# Patient Record
Sex: MALE | Race: WHITE | NOT HISPANIC OR LATINO | URBAN - METROPOLITAN AREA
[De-identification: names, ages, dates, MRNs, and addresses within clinical notes are randomized per-mention and may not be internally consistent; named-entity substitution may affect disease eponyms.]

---

## 2019-05-01 ENCOUNTER — OUTPATIENT (OUTPATIENT)
Dept: OUTPATIENT SERVICES | Facility: HOSPITAL | Age: 24
LOS: 1 days | End: 2019-05-01
Payer: MEDICAID

## 2019-05-01 PROCEDURE — G9001: CPT

## 2019-05-04 ENCOUNTER — INPATIENT (INPATIENT)
Facility: HOSPITAL | Age: 24
LOS: 11 days | Discharge: HOME | End: 2019-05-16
Attending: PSYCHIATRY & NEUROLOGY | Admitting: PSYCHIATRY & NEUROLOGY
Payer: MEDICAID

## 2019-05-04 VITALS
SYSTOLIC BLOOD PRESSURE: 155 MMHG | RESPIRATION RATE: 16 BRPM | HEART RATE: 84 BPM | TEMPERATURE: 98 F | DIASTOLIC BLOOD PRESSURE: 82 MMHG

## 2019-05-04 DIAGNOSIS — F31.4 BIPOLAR DISORDER, CURRENT EPISODE DEPRESSED, SEVERE, WITHOUT PSYCHOTIC FEATURES: ICD-10-CM

## 2019-05-04 DIAGNOSIS — F12.10 CANNABIS ABUSE, UNCOMPLICATED: ICD-10-CM

## 2019-05-04 DIAGNOSIS — F60.3 BORDERLINE PERSONALITY DISORDER: ICD-10-CM

## 2019-05-04 LAB
ALBUMIN SERPL ELPH-MCNC: 5.1 G/DL — HIGH (ref 3.3–5)
ALP SERPL-CCNC: 52 U/L — SIGNIFICANT CHANGE UP (ref 40–120)
ALT FLD-CCNC: 17 U/L — SIGNIFICANT CHANGE UP (ref 4–41)
AMPHET UR-MCNC: NEGATIVE — SIGNIFICANT CHANGE UP
ANION GAP SERPL CALC-SCNC: 15 MMO/L — HIGH (ref 7–14)
APAP SERPL-MCNC: < 15 UG/ML — LOW (ref 15–25)
APPEARANCE UR: CLEAR — SIGNIFICANT CHANGE UP
AST SERPL-CCNC: 21 U/L — SIGNIFICANT CHANGE UP (ref 4–40)
BACTERIA # UR AUTO: NEGATIVE — SIGNIFICANT CHANGE UP
BARBITURATES UR SCN-MCNC: NEGATIVE — SIGNIFICANT CHANGE UP
BASOPHILS # BLD AUTO: 0.03 K/UL — SIGNIFICANT CHANGE UP (ref 0–0.2)
BASOPHILS NFR BLD AUTO: 0.4 % — SIGNIFICANT CHANGE UP (ref 0–2)
BENZODIAZ UR-MCNC: NEGATIVE — SIGNIFICANT CHANGE UP
BILIRUB SERPL-MCNC: 0.8 MG/DL — SIGNIFICANT CHANGE UP (ref 0.2–1.2)
BILIRUB UR-MCNC: SIGNIFICANT CHANGE UP
BLOOD UR QL VISUAL: SIGNIFICANT CHANGE UP
BUN SERPL-MCNC: 18 MG/DL — SIGNIFICANT CHANGE UP (ref 7–23)
CALCIUM SERPL-MCNC: 10.2 MG/DL — SIGNIFICANT CHANGE UP (ref 8.4–10.5)
CANNABINOIDS UR-MCNC: POSITIVE — SIGNIFICANT CHANGE UP
CHLORIDE SERPL-SCNC: 99 MMOL/L — SIGNIFICANT CHANGE UP (ref 98–107)
CO2 SERPL-SCNC: 23 MMOL/L — SIGNIFICANT CHANGE UP (ref 22–31)
COCAINE METAB.OTHER UR-MCNC: NEGATIVE — SIGNIFICANT CHANGE UP
COLOR SPEC: YELLOW — SIGNIFICANT CHANGE UP
CREAT SERPL-MCNC: 1.27 MG/DL — SIGNIFICANT CHANGE UP (ref 0.5–1.3)
EOSINOPHIL # BLD AUTO: 0.02 K/UL — SIGNIFICANT CHANGE UP (ref 0–0.5)
EOSINOPHIL NFR BLD AUTO: 0.3 % — SIGNIFICANT CHANGE UP (ref 0–6)
ETHANOL BLD-MCNC: < 10 MG/DL — SIGNIFICANT CHANGE UP
GLUCOSE SERPL-MCNC: 83 MG/DL — SIGNIFICANT CHANGE UP (ref 70–99)
GLUCOSE UR-MCNC: NEGATIVE — SIGNIFICANT CHANGE UP
HCT VFR BLD CALC: 49.6 % — SIGNIFICANT CHANGE UP (ref 39–50)
HGB BLD-MCNC: 16.6 G/DL — SIGNIFICANT CHANGE UP (ref 13–17)
IMM GRANULOCYTES NFR BLD AUTO: 0.3 % — SIGNIFICANT CHANGE UP (ref 0–1.5)
KETONES UR-MCNC: HIGH
LEUKOCYTE ESTERASE UR-ACNC: NEGATIVE — SIGNIFICANT CHANGE UP
LYMPHOCYTES # BLD AUTO: 1.67 K/UL — SIGNIFICANT CHANGE UP (ref 1–3.3)
LYMPHOCYTES # BLD AUTO: 22.9 % — SIGNIFICANT CHANGE UP (ref 13–44)
MCHC RBC-ENTMCNC: 30.5 PG — SIGNIFICANT CHANGE UP (ref 27–34)
MCHC RBC-ENTMCNC: 33.5 % — SIGNIFICANT CHANGE UP (ref 32–36)
MCV RBC AUTO: 91 FL — SIGNIFICANT CHANGE UP (ref 80–100)
METHADONE UR-MCNC: NEGATIVE — SIGNIFICANT CHANGE UP
MONOCYTES # BLD AUTO: 0.52 K/UL — SIGNIFICANT CHANGE UP (ref 0–0.9)
MONOCYTES NFR BLD AUTO: 7.1 % — SIGNIFICANT CHANGE UP (ref 2–14)
NEUTROPHILS # BLD AUTO: 5.02 K/UL — SIGNIFICANT CHANGE UP (ref 1.8–7.4)
NEUTROPHILS NFR BLD AUTO: 69 % — SIGNIFICANT CHANGE UP (ref 43–77)
NITRITE UR-MCNC: NEGATIVE — SIGNIFICANT CHANGE UP
NRBC # FLD: 0 K/UL — SIGNIFICANT CHANGE UP (ref 0–0)
OPIATES UR-MCNC: NEGATIVE — SIGNIFICANT CHANGE UP
OXYCODONE UR-MCNC: NEGATIVE — SIGNIFICANT CHANGE UP
PCP UR-MCNC: NEGATIVE — SIGNIFICANT CHANGE UP
PH UR: 6 — SIGNIFICANT CHANGE UP (ref 5–8)
PLATELET # BLD AUTO: 217 K/UL — SIGNIFICANT CHANGE UP (ref 150–400)
PMV BLD: 9.2 FL — SIGNIFICANT CHANGE UP (ref 7–13)
POTASSIUM SERPL-MCNC: 4.4 MMOL/L — SIGNIFICANT CHANGE UP (ref 3.5–5.3)
POTASSIUM SERPL-SCNC: 4.4 MMOL/L — SIGNIFICANT CHANGE UP (ref 3.5–5.3)
PROT SERPL-MCNC: 7.7 G/DL — SIGNIFICANT CHANGE UP (ref 6–8.3)
PROT UR-MCNC: 50 — SIGNIFICANT CHANGE UP
RBC # BLD: 5.45 M/UL — SIGNIFICANT CHANGE UP (ref 4.2–5.8)
RBC # FLD: 12.1 % — SIGNIFICANT CHANGE UP (ref 10.3–14.5)
RBC CASTS # UR COMP ASSIST: SIGNIFICANT CHANGE UP (ref 0–?)
SALICYLATES SERPL-MCNC: < 5 MG/DL — LOW (ref 15–30)
SODIUM SERPL-SCNC: 137 MMOL/L — SIGNIFICANT CHANGE UP (ref 135–145)
SP GR SPEC: 1.04 — SIGNIFICANT CHANGE UP (ref 1–1.04)
SQUAMOUS # UR AUTO: SIGNIFICANT CHANGE UP
TSH SERPL-MCNC: 0.85 UIU/ML — SIGNIFICANT CHANGE UP (ref 0.27–4.2)
UROBILINOGEN FLD QL: SIGNIFICANT CHANGE UP
WBC # BLD: 7.28 K/UL — SIGNIFICANT CHANGE UP (ref 3.8–10.5)
WBC # FLD AUTO: 7.28 K/UL — SIGNIFICANT CHANGE UP (ref 3.8–10.5)
WBC UR QL: SIGNIFICANT CHANGE UP (ref 0–?)

## 2019-05-04 PROCEDURE — 99285 EMERGENCY DEPT VISIT HI MDM: CPT

## 2019-05-04 RX ORDER — RISPERIDONE 4 MG/1
0.5 TABLET ORAL EVERY 6 HOURS
Qty: 0 | Refills: 0 | Status: DISCONTINUED | OUTPATIENT
Start: 2019-05-04 | End: 2019-05-16

## 2019-05-04 RX ORDER — TETANUS TOXOID, REDUCED DIPHTHERIA TOXOID AND ACELLULAR PERTUSSIS VACCINE, ADSORBED 5; 2.5; 8; 8; 2.5 [IU]/.5ML; [IU]/.5ML; UG/.5ML; UG/.5ML; UG/.5ML
0.5 SUSPENSION INTRAMUSCULAR ONCE
Qty: 0 | Refills: 0 | Status: COMPLETED | OUTPATIENT
Start: 2019-05-04 | End: 2019-05-04

## 2019-05-04 RX ORDER — CLONAZEPAM 1 MG
0.5 TABLET ORAL AT BEDTIME
Qty: 0 | Refills: 0 | Status: DISCONTINUED | OUTPATIENT
Start: 2019-05-04 | End: 2019-05-04

## 2019-05-04 RX ORDER — RISPERIDONE 4 MG/1
0.5 TABLET ORAL ONCE
Qty: 0 | Refills: 0 | Status: COMPLETED | OUTPATIENT
Start: 2019-05-04 | End: 2019-05-04

## 2019-05-04 RX ORDER — RISPERIDONE 4 MG/1
0.5 TABLET ORAL
Qty: 0 | Refills: 0 | Status: DISCONTINUED | OUTPATIENT
Start: 2019-05-04 | End: 2019-05-04

## 2019-05-04 RX ORDER — BUPROPION HYDROCHLORIDE 150 MG/1
150 TABLET, EXTENDED RELEASE ORAL DAILY
Qty: 0 | Refills: 0 | Status: DISCONTINUED | OUTPATIENT
Start: 2019-05-04 | End: 2019-05-04

## 2019-05-04 RX ADMIN — TETANUS TOXOID, REDUCED DIPHTHERIA TOXOID AND ACELLULAR PERTUSSIS VACCINE, ADSORBED 0.5 MILLILITER(S): 5; 2.5; 8; 8; 2.5 SUSPENSION INTRAMUSCULAR at 19:33

## 2019-05-04 RX ADMIN — Medication 2 MILLIGRAM(S): at 18:45

## 2019-05-04 RX ADMIN — Medication 1 MILLIGRAM(S): at 22:35

## 2019-05-04 RX ADMIN — Medication 0.5 MILLIGRAM(S): at 19:53

## 2019-05-04 RX ADMIN — RISPERIDONE 0.5 MILLIGRAM(S): 4 TABLET ORAL at 18:45

## 2019-05-04 NOTE — ED PROVIDER NOTE - CLINICAL SUMMARY MEDICAL DECISION MAKING FREE TEXT BOX
23 y/o M   Dx Depression. Labs , Urine Tox/UA, EKG.  Multiple superficial linear laceration to left arm. Small abrasion to left side of neck.  No indication for suturing , Tetanus updated.   Bacitracin to area.    Medical evaluation performed. There is no clinical evidence of intoxication or any acute medical problem requiring immediate intervention. Patient is awaiting psychiatric consultation. Final disposition will be determined by psychiatrist. Recommend inpatient psychiatric admission

## 2019-05-04 NOTE — ED BEHAVIORAL HEALTH ASSESSMENT NOTE - SUMMARY
Patient is a 25 y/o male, living with his father and younger sister, currently unemployed, with a history of self-reported bipolar d/o, ADD and anxiety, cannabis use d/o no past hospitalizations, no known medical hx, who is brought in by his father for evaluation for a possible suicide attempt. Pt has multiple superficial cuts on bilateral upper arms, and one smaller cut on his neck. Pt reports that he has been feeling depressed for several months and having passive thoughts of suicide. Multiple current stressors, felt abandoned yesterday and used a  to make multiple cuts. Pt reports he was contemplating the act all day and wrote two suicide notes to his family. He relates that he knew the superficial lacerations would "not kill me" however felt some sense of relief from making them. Today pt's father found out about the event and brought the pt in for evaluation. Pt reports that in the last few months he has had trouble sleeping, decreased appetite, low energy/ concentration, and the ongoing suicidal thoughts. Pt is seeing a psychiatrist x3 months, multiple med trials. Patient with hx of manic symptoms, now depressed with passive suicidal thoughts, no current intent or plan. Pt signed voluntary admission for stabilization and medication titration.

## 2019-05-04 NOTE — ED BEHAVIORAL HEALTH NOTE - BEHAVIORAL HEALTH NOTE
MARJAN contacted [employer] at [phone] for insurance authorization.  MARJAN spoke with Destiny QUIROS; clinicals provided.  As per Destiny, pt is authorized for three days of inpatient care at [facility].  Auth # is [id number] from 5/4/19 to 5/6/19.

## 2019-05-04 NOTE — ED PROVIDER NOTE - OBJECTIVE STATEMENT
23 y/o M hx  BIBA w c/o depression and suicidal  behavior- self inflicting several cut to his left arm and attempting to cut his throat. Multiple superficial linear laceration to left arm. Small abrasion to left side of neck.   Admits to multiple social stressors with inability to cope . Denies HI/VH/AH.  Denies falling, punching or kicking any objects. Denies pain, SOB, fever, chills, chest/ abdominal  discomfort. Denies  recent use of  alcohol or illicit drugs.

## 2019-05-04 NOTE — ED BEHAVIORAL HEALTH ASSESSMENT NOTE - RISK ASSESSMENT
Given the patient's underlying personality pathology he is at a chronic elevated risk for self harming behaviors, pt now has multiple stressors with mood episode.   protective factors are supportive family, stable housing and pt is future oriented.

## 2019-05-04 NOTE — ED BEHAVIORAL HEALTH ASSESSMENT NOTE - DETAILS
see HPI for details, cut himself with a  yesterday reports gi distress from zoloft, and feeling "unwell" from others mother ocd, sister depression reports feeling traumatized at age 18 by death of girlfriend NICHOLAS NP

## 2019-05-04 NOTE — ED BEHAVIORAL HEALTH ASSESSMENT NOTE - HPI (INCLUDE ILLNESS QUALITY, SEVERITY, DURATION, TIMING, CONTEXT, MODIFYING FACTORS, ASSOCIATED SIGNS AND SYMPTOMS)
Patient is a 23 y/o male, living with his father and younger sister, currently unemployed, with a history of self-reported bipolar d/o, ADD and anxiety, cannabis use d/o no past hospitalizations, no known medical hx, who is brought in by his father for evaluation for a possible suicide attempt.     Chart reviewed.    Patient is seen and evaluated, he is calm, cooperative but makes poor eye contact and is somewhat disheveled. He reports that he is at the hospital because "I was depressed and cut myself." Pt has multiple superficial cuts on bilateral upper arms, and one smaller cut on his neck. Pt reports that he has been feeling depressed for several months and having passive thoughts of suicide. This week was his grandmother's death anniversary and pt has been "very upset."  Yesterday pt reports he did not hear from his sister or his friend, felt alone and used a  to make multiple cuts. Pt reports he was contemplating the act all day and wrote two suicide notes to his family. He relates that he knew the superficial lacerations would "not kill me" however felt some sense of relief from making them. Pt then called his sister who came home and talked to the patient. This evening pt's father found out about the event and brought the pt in for evaluation. Pt reports that in the last few months he has had trouble sleeping, decreased appetite, low energy/ concentration, and the ongoing suicidal thoughts. Pt states that he started to see a psychiatrist Dr. Banks about three months ago and since then has been on several different medications (klonopin, seroquel, lithium, zoloft, zyprexa), now only on Klonopin and pending auth for Olanzapine/fluoxetine (symbyax). Pt reports that he is treated for bipolar depression (given hx of manic symptoms in the past). Pt also reports chronic feelings of emptiness, fear of abandonment, unstable relationships, and reports getting a tattoo as a way of relieving emotional distress. Patient denied auditory/visual hallucinations, denied any current intent to take his life, denied homicidal ideation. Pt reports smoking cannabis several times per week.     Collateral obtained from pt's father by MARJAN(see  note). Father made aware of admission.     Kinjal Avila | Reference #: [id number]   Patient Name: Cosme Lawton YOB: 1995   Address: [address] [place] [address] [place] [address] Sex: Male  04/09/2019 04/22/2019 clonazepam 1 mg tablet  7 7 Davian Banks MD   02/27/2019 03/20/2019 clonazepam 2 mg tablet  30 30 Davian Banks MD     02/20/2019 02/20/2019 clonazepam 2 mg tablet  30 30 Davian Banks MD     01/30/2019 01/30/2019 clonazepam 1 mg tablet  30 30 Davian Banks MD

## 2019-05-04 NOTE — ED BEHAVIORAL HEALTH ASSESSMENT NOTE - DESCRIPTION
Temperature  Temp (F): 98 Degrees F  Temp (C) Temp (C): 36.7 Degrees C  Heart Rate  Heart Rate Heart Rate (beats/min): 84 /min  Noninvasive Blood Pressure  BP Systolic Systolic: 155 mm Hg  BP Diastolic Diastolic (mm Hg): 82 mm Hg  Respiratory/Pulse Oximetry  Respiration Rate (breaths/min) Respiration Rate (breaths/min): 16 /min  Pain/Comfort  Pain Assessment/Number Scale (0-10) Adult  Presence of Pain: denies pain/discomfort  Pain Rating (0-10): Rest: 0 denied lives with father and sister, unemployed

## 2019-05-04 NOTE — ED BEHAVIORAL HEALTH ASSESSMENT NOTE - PSYCHIATRIC ISSUES AND PLAN (INCLUDE STANDING AND PRN MEDICATION)
bipolar disorder: Risperdal 0.5 mg po bid, smoking/depression: Wellbutrin 150 mg po qd, klonopin 0.5 mg po hs (appears medication was tapered off), prns, borderline pd, will need therapy

## 2019-05-04 NOTE — ED BEHAVIORAL HEALTH NOTE - BEHAVIORAL HEALTH NOTE
MARJAN met with pt's father, Cosme Lawton, to obtain collateral information.  Pt's father reports that pt has been depressed since the end of last year.  He reports that last Christmas pt reported depressed feelings and had cut himself during a family holiday party.  He also states that after then, pt was set up with a psychiatrist in [place].  He states that pt seemed to be doing OK when he first started seeing the psychiatrist.  Pt's father also reports that pt has been unemployed for some time and that he has had difficulties managing the payment on his car insurance and payments related to tickets.  Pt's father states that pt is supposed to start a new job at [employer] this Monday, though.  Father states that over the past day, pt reported that he was feeling depressed and was in bed for most of the day.  He states that he was not aware that pt had attempted to hurt himself today.  Pt's father states that pt does smoke marijuana, however pt states he has reduced his marijuana use, but pt's father and his psychiatrist are not too sure about that.  Pt's father also states that pt broke up with his girlfriend of many years back in December, however he has been talking to her again recently.  Pt's father states that he is unsure if this is affecting his depressive feelings.  Pt's father also states that pt's mother has a history of OCD.  There is no other reported family psychiatric history.

## 2019-05-04 NOTE — ED BEHAVIORAL HEALTH ASSESSMENT NOTE - SUICIDE RISK FACTORS
Hopelessness/Anhedonia/Mood episode/Agitation/severe anxiety/Access to means (pills, firearms, etc.)/Highly impulsive behavior/Substance abuse/dependence

## 2019-05-04 NOTE — ED ADULT TRIAGE NOTE - CHIEF COMPLAINT QUOTE
Pt c/o increasing depression with SI and a plan, insomnia 1 meal in the last week...  Pt superficial cuts on L upper arm and stated that he was attempting to cut his throat.  Denies hallucinations. Pt is calm and cooperative at triage

## 2019-05-04 NOTE — ED PROVIDER NOTE - CARE PLAN
Principal Discharge DX:	Bipolar disorder, current episode depressed, severe, without psychotic features  Secondary Diagnosis:	Cannabis use disorder, mild, abuse  Secondary Diagnosis:	Borderline personality disorder in remission

## 2019-05-05 PROCEDURE — 99222 1ST HOSP IP/OBS MODERATE 55: CPT

## 2019-05-05 RX ORDER — CLONAZEPAM 1 MG
0.5 TABLET ORAL AT BEDTIME
Qty: 0 | Refills: 0 | Status: DISCONTINUED | OUTPATIENT
Start: 2019-05-05 | End: 2019-05-12

## 2019-05-05 RX ORDER — BUPROPION HYDROCHLORIDE 150 MG/1
150 TABLET, EXTENDED RELEASE ORAL DAILY
Qty: 0 | Refills: 0 | Status: DISCONTINUED | OUTPATIENT
Start: 2019-05-05 | End: 2019-05-06

## 2019-05-05 RX ORDER — RISPERIDONE 4 MG/1
0.5 TABLET ORAL
Qty: 0 | Refills: 0 | Status: DISCONTINUED | OUTPATIENT
Start: 2019-05-05 | End: 2019-05-06

## 2019-05-05 RX ORDER — LANOLIN ALCOHOL/MO/W.PET/CERES
3 CREAM (GRAM) TOPICAL AT BEDTIME
Qty: 0 | Refills: 0 | Status: DISCONTINUED | OUTPATIENT
Start: 2019-05-05 | End: 2019-05-16

## 2019-05-05 RX ADMIN — Medication 3 MILLIGRAM(S): at 21:41

## 2019-05-05 RX ADMIN — Medication 0.5 MILLIGRAM(S): at 21:20

## 2019-05-05 RX ADMIN — RISPERIDONE 0.5 MILLIGRAM(S): 4 TABLET ORAL at 21:20

## 2019-05-06 PROCEDURE — 99232 SBSQ HOSP IP/OBS MODERATE 35: CPT

## 2019-05-06 RX ORDER — QUETIAPINE FUMARATE 200 MG/1
100 TABLET, FILM COATED ORAL AT BEDTIME
Qty: 0 | Refills: 0 | Status: DISCONTINUED | OUTPATIENT
Start: 2019-05-06 | End: 2019-05-10

## 2019-05-06 RX ORDER — ESCITALOPRAM OXALATE 10 MG/1
5 TABLET, FILM COATED ORAL DAILY
Qty: 0 | Refills: 0 | Status: DISCONTINUED | OUTPATIENT
Start: 2019-05-06 | End: 2019-05-07

## 2019-05-06 RX ADMIN — Medication 0.5 MILLIGRAM(S): at 22:27

## 2019-05-06 RX ADMIN — ESCITALOPRAM OXALATE 5 MILLIGRAM(S): 10 TABLET, FILM COATED ORAL at 13:38

## 2019-05-06 RX ADMIN — QUETIAPINE FUMARATE 100 MILLIGRAM(S): 200 TABLET, FILM COATED ORAL at 22:27

## 2019-05-07 PROCEDURE — 99232 SBSQ HOSP IP/OBS MODERATE 35: CPT

## 2019-05-07 RX ORDER — ESCITALOPRAM OXALATE 10 MG/1
10 TABLET, FILM COATED ORAL DAILY
Qty: 0 | Refills: 0 | Status: DISCONTINUED | OUTPATIENT
Start: 2019-05-07 | End: 2019-05-13

## 2019-05-07 RX ADMIN — Medication 0.5 MILLIGRAM(S): at 20:07

## 2019-05-07 RX ADMIN — QUETIAPINE FUMARATE 100 MILLIGRAM(S): 200 TABLET, FILM COATED ORAL at 20:07

## 2019-05-07 RX ADMIN — ESCITALOPRAM OXALATE 5 MILLIGRAM(S): 10 TABLET, FILM COATED ORAL at 08:52

## 2019-05-08 RX ADMIN — ESCITALOPRAM OXALATE 10 MILLIGRAM(S): 10 TABLET, FILM COATED ORAL at 08:21

## 2019-05-08 RX ADMIN — Medication 0.5 MILLIGRAM(S): at 20:05

## 2019-05-08 RX ADMIN — QUETIAPINE FUMARATE 100 MILLIGRAM(S): 200 TABLET, FILM COATED ORAL at 20:05

## 2019-05-09 DIAGNOSIS — Z71.89 OTHER SPECIFIED COUNSELING: ICD-10-CM

## 2019-05-09 RX ADMIN — QUETIAPINE FUMARATE 100 MILLIGRAM(S): 200 TABLET, FILM COATED ORAL at 20:43

## 2019-05-09 RX ADMIN — ESCITALOPRAM OXALATE 10 MILLIGRAM(S): 10 TABLET, FILM COATED ORAL at 08:30

## 2019-05-09 RX ADMIN — Medication 1 MILLIGRAM(S): at 16:00

## 2019-05-09 RX ADMIN — Medication 0.5 MILLIGRAM(S): at 20:44

## 2019-05-10 PROCEDURE — 90853 GROUP PSYCHOTHERAPY: CPT

## 2019-05-10 PROCEDURE — 99232 SBSQ HOSP IP/OBS MODERATE 35: CPT

## 2019-05-10 RX ORDER — DIPHENHYDRAMINE HCL 50 MG
50 CAPSULE ORAL ONCE
Refills: 0 | Status: COMPLETED | OUTPATIENT
Start: 2019-05-10 | End: 2019-05-10

## 2019-05-10 RX ORDER — QUETIAPINE FUMARATE 200 MG/1
100 TABLET, FILM COATED ORAL
Refills: 0 | Status: DISCONTINUED | OUTPATIENT
Start: 2019-05-10 | End: 2019-05-16

## 2019-05-10 RX ADMIN — QUETIAPINE FUMARATE 100 MILLIGRAM(S): 200 TABLET, FILM COATED ORAL at 19:06

## 2019-05-10 RX ADMIN — ESCITALOPRAM OXALATE 10 MILLIGRAM(S): 10 TABLET, FILM COATED ORAL at 09:04

## 2019-05-10 RX ADMIN — Medication 0.5 MILLIGRAM(S): at 20:40

## 2019-05-10 RX ADMIN — Medication 50 MILLIGRAM(S): at 19:50

## 2019-05-11 RX ADMIN — QUETIAPINE FUMARATE 100 MILLIGRAM(S): 200 TABLET, FILM COATED ORAL at 20:07

## 2019-05-11 RX ADMIN — ESCITALOPRAM OXALATE 10 MILLIGRAM(S): 10 TABLET, FILM COATED ORAL at 13:00

## 2019-05-11 RX ADMIN — Medication 0.5 MILLIGRAM(S): at 20:07

## 2019-05-12 RX ADMIN — Medication 0.5 MILLIGRAM(S): at 20:55

## 2019-05-12 RX ADMIN — ESCITALOPRAM OXALATE 10 MILLIGRAM(S): 10 TABLET, FILM COATED ORAL at 12:40

## 2019-05-12 RX ADMIN — QUETIAPINE FUMARATE 100 MILLIGRAM(S): 200 TABLET, FILM COATED ORAL at 18:00

## 2019-05-13 PROCEDURE — 99232 SBSQ HOSP IP/OBS MODERATE 35: CPT

## 2019-05-13 RX ORDER — ESCITALOPRAM OXALATE 10 MG/1
15 TABLET, FILM COATED ORAL DAILY
Refills: 0 | Status: DISCONTINUED | OUTPATIENT
Start: 2019-05-13 | End: 2019-05-16

## 2019-05-13 RX ADMIN — ESCITALOPRAM OXALATE 15 MILLIGRAM(S): 10 TABLET, FILM COATED ORAL at 09:46

## 2019-05-13 RX ADMIN — QUETIAPINE FUMARATE 100 MILLIGRAM(S): 200 TABLET, FILM COATED ORAL at 17:45

## 2019-05-14 PROCEDURE — 99232 SBSQ HOSP IP/OBS MODERATE 35: CPT

## 2019-05-14 RX ADMIN — QUETIAPINE FUMARATE 100 MILLIGRAM(S): 200 TABLET, FILM COATED ORAL at 17:41

## 2019-05-14 RX ADMIN — ESCITALOPRAM OXALATE 15 MILLIGRAM(S): 10 TABLET, FILM COATED ORAL at 10:16

## 2019-05-15 PROCEDURE — 99232 SBSQ HOSP IP/OBS MODERATE 35: CPT

## 2019-05-15 RX ADMIN — QUETIAPINE FUMARATE 100 MILLIGRAM(S): 200 TABLET, FILM COATED ORAL at 18:35

## 2019-05-15 RX ADMIN — ESCITALOPRAM OXALATE 15 MILLIGRAM(S): 10 TABLET, FILM COATED ORAL at 12:01

## 2019-05-16 VITALS — TEMPERATURE: 98 F

## 2019-05-16 PROCEDURE — 99238 HOSP IP/OBS DSCHRG MGMT 30/<: CPT

## 2019-05-16 RX ORDER — QUETIAPINE FUMARATE 200 MG/1
1 TABLET, FILM COATED ORAL
Qty: 30 | Refills: 0
Start: 2019-05-16 | End: 2019-06-14

## 2019-05-16 RX ORDER — ESCITALOPRAM OXALATE 10 MG/1
1 TABLET, FILM COATED ORAL
Qty: 30 | Refills: 0
Start: 2019-05-16 | End: 2019-06-14

## 2019-05-16 RX ADMIN — ESCITALOPRAM OXALATE 15 MILLIGRAM(S): 10 TABLET, FILM COATED ORAL at 11:14

## 2023-05-02 NOTE — ED BEHAVIORAL HEALTH ASSESSMENT NOTE - SECONDARY DX1
Addended by: ANISHA CHOI on: 5/2/2023 03:24 PM     Modules accepted: Orders    
Borderline personality disorder in remission

## 2024-03-24 NOTE — ED BEHAVIORAL HEALTH ASSESSMENT NOTE - MEDICAL ISSUES AND PLAN (INCLUDE STANDING AND PRN MEDICATION)
63 y/o male w/ PMH HTN, HLD, CAD w/ 2 stents 12 years ago c/o 1 week history of intermittent left-sided chest pain w/ radiation to the neck, saw cardiologist, scheduled for stress test this Monday. Pt then had 2 day history of increasing left-sided chest pain that worsened today w/ radiation to the neck, prompting ED arrival. Pt's pain has currently improved, taken 405mg ASA prior to ED arrival. Pt is otherwise asymptomatic. Denies fevers, chills, nausea, vomiting, dizziness, SOB, abdominal pain, dysuria, hematuria. Pt did not take his BP medications today. Will screen for ACS (troponin), anemia/electrolytes, and chest x ray to screen for cardiopulmonary pathology. BNP for heart failure. Cards consult and likely CDU/admission for cardiac workup.     Cardiologist: Marquez Haas
n/a